# Patient Record
Sex: FEMALE | Race: BLACK OR AFRICAN AMERICAN | Employment: UNEMPLOYED | URBAN - METROPOLITAN AREA
[De-identification: names, ages, dates, MRNs, and addresses within clinical notes are randomized per-mention and may not be internally consistent; named-entity substitution may affect disease eponyms.]

---

## 2018-05-10 ENCOUNTER — HOSPITAL ENCOUNTER (OUTPATIENT)
Dept: MAMMOGRAPHY | Age: 53
Discharge: HOME | End: 2018-05-12
Payer: COMMERCIAL

## 2018-05-10 DIAGNOSIS — Z12.31 VISIT FOR SCREENING MAMMOGRAM: ICD-10-CM

## 2018-05-10 PROCEDURE — 77063 BREAST TOMOSYNTHESIS BI: CPT

## 2020-11-03 ENCOUNTER — HOSPITAL ENCOUNTER (OUTPATIENT)
Dept: GENERAL RADIOLOGY | Age: 55
Discharge: HOME | End: 2020-11-05

## 2020-11-03 ENCOUNTER — HOSPITAL ENCOUNTER (OUTPATIENT)
Age: 55
Discharge: HOME | End: 2020-11-05

## 2020-11-03 PROCEDURE — 71046 X-RAY EXAM CHEST 2 VIEWS: CPT

## 2022-12-27 ENCOUNTER — HOSPITAL ENCOUNTER (OUTPATIENT)
Dept: NEUROLOGY | Age: 57
Discharge: HOME | End: 2022-12-27
Payer: MEDICAID

## 2022-12-27 VITALS — WEIGHT: 240 LBS | HEIGHT: 64 IN | BODY MASS INDEX: 40.97 KG/M2

## 2022-12-27 PROCEDURE — 95911 NRV CNDJ TEST 9-10 STUDIES: CPT

## 2022-12-27 PROCEDURE — 95886 MUSC TEST DONE W/N TEST COMP: CPT

## 2022-12-27 NOTE — PROCEDURES
[address] [facility]  [address] [facility], [address] [place] [address]  Phone: [phone]  Fax: [phone]      Referring Provider: Dixie Albright [place]*  Primary Care Physician: Sallie Jacobson MD  Patient Name: Kika Davis  Patient YOB: 1965  Gender: female  BMI: Body mass index is 41.2 kg/m². Height 5' 4\" (1.626 m), weight 240 lb (108.9 kg), not currently breastfeeding. 12/27/2022    Reason for referral: EMG    Description of clinical problem:   No chief complaint on file. Pain Yes   ; Numbness/tingling  Yes; Weakness  No       Brief physical exam:   Sensory deficit No; Weakness No; Atrophy  No; Reflex abnormality Yes      Study Limitations:  none    Summary of Findings:   Nerve conduction studies: The following nerve conduction studies were abnormal:   none  All other nerve conduction studies listed in the table above were normal in latency, amplitude and conduction velocity. [facility]   Electrodiagnostic Laboratory  [place]        Full Name: Cristian Tatum Gender: Female  MRN: [id number] YOB: 1965  Location[de-identified] Outpt. Visit Date: 12/27/2022 09:07  Age: 59 Years 1 Months Old  Examining Physician: Dr. Jasmin Copeland  Referring Physician: MANNY Watts NP, S  Technician: Delia Chambers   Height: 5 feet 4 inch  Weight: 240 lbs  Notes: Paresthesia lower ext. R20.2      Motor NCS      Nerve / Sites Lat. Amplitude Distance Lat Diff Velocity Temp. Amp. 1-2    ms mV cm ms m/s °C %   L Peroneal - EDB      Ankle 3.54 4.2 8   31 100      Pop fossa 11.35 3.0 39 7.81 50 31 71.9   R Peroneal - EDB      Ankle 3.33 10.4 8   31.2 100      Pop fossa 11.51 8.0 39 8.18 48 31.3 77.5   R Tibial - AH      Ankle 3.96 7.1 8   31 100      Pop fossa 13.33 4.3 41 9.38 44 31 60.3   L Tibial - AH      Ankle 4.27 3.5 8   31.6 100      Pop fossa 12.86 3.0 41 8.59 48 31.6 84       Sensory NCS      Nerve / Sites Onset Lat Peak Lat PP Amp Distance Velocity Temp.    ms ms µV cm m/s °C   L Superficial peroneal - Ankle      Lat leg 2.40 2.76 7.9 10 42 31   R Superficial peroneal - Ankle      Lat leg 2.19 2.76 6.9 10 46 31.5   R Sural - Ankle (Calf)      Calf 2.55 3.44 6.6 14 55 31.2   L Sural - Ankle (Calf)      Calf 3.02 3.91 8.6 14 46 31.5       F  Wave      Nerve F Lat M Lat F-M Lat    ms ms ms   L Peroneal - EDB 48.7 4.1 44.6   R Peroneal - EDB 47.3 3.8 43.5   R Tibial - AH 49.0 4.3 44.6   L Tibial - AH 50.9 5.7 45.2       H Reflex      Nerve Lat Hmax    ms   R Tibial - Soleus 29.4   L Tibial - Soleus 29.3       EMG         EMG Summary Table     Spontaneous MUAP Recruitment   Muscle IA Fib PSW Fasc H.F. Amp Dur. PPP Pattern   L. Extensor digitorum brevis N None None None None N N N N   L. Gastrocnemius (Lateral head) N None None None None N N N N   L. Gastrocnemius (Medial head) N None None None None N N N N   L. Tibialis anterior N None None None None N N N N   L. Vastus lateralis N None None None None N N N N                  Needle EMG:   Needle EMG was performed using a monopolar needle. The following abnormalities were seen on needle EMG: none  All other muscles tested, as listed in the table above demonstrated normal amplitude, duration, phases and recruitment and no active denervation signs were seen. Diagnostic Interpretation:   Normal EMG of lower extremities. There is no evidence of peripheral neuropathy. Small fiber sensory neuropathy cannot be adequately studied by standard electrodiagnostic means. Clinical correlation advised. Technologist: Berenice Mtz MD    Nerve conduction studies and electromyography were performed according to our laboratory policies and procedures which can be provided upon request. All abnormal values are identified in the table.  Laboratory normal values can also be provided upon request.       Cc: Tressa Shearer, APR*  Jones Barnes MD

## 2023-01-10 ENCOUNTER — HOSPITAL ENCOUNTER (OUTPATIENT)
Dept: GENERAL RADIOLOGY | Age: 58
Discharge: HOME | End: 2023-01-12
Payer: MEDICAID

## 2023-01-10 VITALS — HEIGHT: 64 IN | WEIGHT: 243 LBS | BODY MASS INDEX: 41.48 KG/M2

## 2023-01-10 DIAGNOSIS — N64.4 MASTODYNIA: ICD-10-CM

## 2023-01-10 PROCEDURE — 77066 DX MAMMO INCL CAD BI: CPT

## 2023-06-19 NOTE — PROGRESS NOTES
Patient scheduled 9/26/23 11:30 am ordered by Sawyer Jerez NP. Patient given instructions and verbalized understanding.

## 2023-09-25 ENCOUNTER — TELEPHONE (OUTPATIENT)
Dept: CARDIAC CATH/INVASIVE PROCEDURES | Age: 58
End: 2023-09-25

## 2023-09-26 ENCOUNTER — HOSPITAL ENCOUNTER (OUTPATIENT)
Dept: CARDIAC CATH/INVASIVE PROCEDURES | Age: 58
Discharge: HOME | End: 2023-09-26
Payer: MEDICAID

## 2023-09-26 VITALS
TEMPERATURE: 97 F | OXYGEN SATURATION: 93 % | DIASTOLIC BLOOD PRESSURE: 53 MMHG | BODY MASS INDEX: 37.56 KG/M2 | RESPIRATION RATE: 18 BRPM | HEART RATE: 54 BPM | HEIGHT: 64 IN | WEIGHT: 220 LBS | SYSTOLIC BLOOD PRESSURE: 168 MMHG

## 2023-09-26 PROBLEM — R55 SYNCOPE: Status: ACTIVE | Noted: 2023-09-26

## 2023-09-26 PROCEDURE — 93660 TILT TABLE EVALUATION: CPT | Performed by: INTERNAL MEDICINE

## 2023-09-26 PROCEDURE — 93660 TILT TABLE EVALUATION: CPT

## 2023-09-26 RX ORDER — PRAVASTATIN SODIUM 40 MG
40 TABLET ORAL DAILY
COMMUNITY

## 2023-09-26 NOTE — PROCEDURES
[address] [facility] Electrophysiology  Procedure Report  PATIENT: Pedro Moreno  MEDICAL RECORD NUMBER: [id number]  DATE OF PROCEDURE:  9/26/2023  ATTENDING ELECTROPHYSIOLOGIST:  Angel Drake MD  REFERRING PHYSICIAN: KATHY Palomo    PROCEDURE:    1. Tilt Table Testing    INDICATION: Fall versus syncope    PROCEDURE PERFORMED BY: Angel Drake MD    PROCEDURE TIME: Thirty minutes    COMPLICATIONS: None immediately apparent    DESCRIPTION OF PROCEDURE: The risks, benefits, and alternatives to the procedure were discussed with the patient, and informed consent was obtained. The patient was brought to the Tilt table lab. Baseline supine blood pressures and heart rates were obtained. The baseline blood pressure was 149/58 mmHg and baseline heart rate is 55 bpm.  After 5 minutes in the supine position, the patient was placed in the 70 degree head-upright position. After approximately 20 minutes no symptoms had occurred and thus, a single 0.4mg sublingual nitroglycerine tablet was given and the 70 degree head-upright position was continued. The justin BP recorded was 104/68 mmHg with a heart rate of 94 bpm.  The patient had no symptoms. The patient was placed back down in the supine position. At the conclusion of tilt table testing, the patient's blood pressure and heart rate were back at baseline. SUMMARY:  1. Negative tilt table test for Neurocardiogenic syncope. RECOMMENDATIONS:  1. Maintain adequate hydration. Drink approximately 2-2.5 L of non-caffeinated beverage/ per day. 2. Limit caffeine and alcohol use. 3. Liberalize salt intake. 4. If prescribed, please wear the support stockings or compression socks as instructed. 5. If medication is prescribed, please take it as instructed. 6. Other life style modifications as per discharge instruction.     Angel Drake MD  Cardiac Electrophysiology  [facility])

## 2023-09-27 NOTE — H&P
Media Information      Document Information    Consult Note:  Consultation   9/25/23 Office note Dr. Debbie Alfonso   09/26/2023 11:01   Attached To:   Abstract on 9/26/23 with Elba Delacruz MA [facility]       Addendum:    Office note reviewed. No change has been made. The patient seen and examined. Risks, benefits and alternatives of TTT were discussed. The patient verbalizes understanding and agrees to proceed with the procedure.        Emelia Elias MD  Cardiac Electrophysiology  [address] [facility]  [facility] [place] [facility] Electrophysiology  1:21 PM  9/27/2023

## 2023-11-17 ENCOUNTER — HOSPITAL ENCOUNTER (OUTPATIENT)
Dept: PHYSICAL THERAPY | Age: 58
Setting detail: THERAPIES SERIES
Discharge: HOME | End: 2023-11-17
Payer: MEDICAID

## 2023-11-17 PROCEDURE — 97161 PT EVAL LOW COMPLEX 20 MIN: CPT

## 2024-01-16 ENCOUNTER — HOSPITAL ENCOUNTER (OUTPATIENT)
Dept: PHYSICAL THERAPY | Age: 59
Setting detail: THERAPIES SERIES
Discharge: HOME | End: 2024-01-16

## 2024-01-18 ENCOUNTER — HOSPITAL ENCOUNTER (OUTPATIENT)
Dept: PHYSICAL THERAPY | Age: 59
Setting detail: THERAPIES SERIES
Discharge: HOME | End: 2024-01-18

## 2024-01-23 ENCOUNTER — HOSPITAL ENCOUNTER (OUTPATIENT)
Dept: PHYSICAL THERAPY | Age: 59
Setting detail: THERAPIES SERIES
Discharge: HOME | End: 2024-01-23
Payer: MEDICAID

## 2024-01-23 PROCEDURE — 97113 AQUATIC THERAPY/EXERCISES: CPT

## 2024-01-25 ENCOUNTER — HOSPITAL ENCOUNTER (OUTPATIENT)
Dept: NEUROLOGY | Age: 59
Discharge: HOME | End: 2024-01-25
Payer: MEDICAID

## 2024-01-25 ENCOUNTER — HOSPITAL ENCOUNTER (OUTPATIENT)
Dept: PHYSICAL THERAPY | Age: 59
Setting detail: THERAPIES SERIES
Discharge: HOME | End: 2024-01-25
Payer: MEDICAID

## 2024-01-25 PROBLEM — M54.12 CERVICAL RADICULOPATHY: Status: ACTIVE | Noted: 2024-01-25

## 2024-01-25 PROBLEM — G56.03 BILATERAL CARPAL TUNNEL SYNDROME: Status: ACTIVE | Noted: 2024-01-25

## 2024-01-25 PROCEDURE — 95910 NRV CNDJ TEST 7-8 STUDIES: CPT | Performed by: PSYCHIATRY & NEUROLOGY

## 2024-01-25 PROCEDURE — 95886 MUSC TEST DONE W/N TEST COMP: CPT

## 2024-01-25 PROCEDURE — 95910 NRV CNDJ TEST 7-8 STUDIES: CPT

## 2024-01-25 PROCEDURE — 95886 MUSC TEST DONE W/N TEST COMP: CPT | Performed by: PSYCHIATRY & NEUROLOGY

## 2024-01-25 NOTE — PROCEDURES
profundus (Ulnar) N None None None None N N N N   R. Flexor pollicis longus N None None None None N N N N   R. First dorsal interosseous N None None None None N N N N   R. Abductor pollicis brevis N None None None None N N N N         Nerve conduction studies in both arms found minimal delays in the distal motor latencies of both median nerves.  Both median distal sensory latencies were prolonged, with decreased antidromic conduction velocities.  Both median F-wave latencies were also delayed.    These findings were compared to the referential evaluation this laboratory, available upon request.    Electrodiagnostic examination of the right arm was unremarkable.  However, acute and chronic denervation changes were seen in the paraspinals.    Electrodiagnostic examination of both arms disclosed evidence diagnostic of the following---    1.  Bilateral median neuropathies at/or distal to the wrists, of minimal severity.  These findings were consistent with carpal tunnel syndrome.    2.  Right cervical motor radiculopathy or intracanalicular lesions, as noted by the denervation changes in the paraspinals.  However, these radiculopathies were not further defined within normal needle testing of the arm, forearm and hand musculature.    There were no other peripheral neuropathies.  There are no other motor radiculopathies or intracanalicular lesions.  Sensory radiculopathies cannot be evaluated by electrodiagnostic means.    Clinically, the patient noted paresthesias in both hands, which awakened her at night.  Flicking her wrists proved helpful.  She denied any pains or other discomforts.  The recent MRI of her cervical spine revealed only minimal degenerative changes.      On brief neurological examination, I found no Tinel's signs at the wrists, or motor or sensory compromise.  Her difficulties are likely related to the carpal tunnel syndrome; however, the cervical radicular disease may be contributing.    Clinical

## 2024-01-27 NOTE — PROGRESS NOTES
[facility]  Phone: [phone] Fax: [phone]        Physical Therapy Aquatic Flow Sheet       Date 2024      Date:  2024    Patient Name:  Shania Hooker    :  1965  Restrictions/Precautions:    Diagnosis:   Pain in unspecified joint [M25.50] Multi joint chronic pain   Treatment Diagnosis:    Insurance/Certification information:  [facility] / Plan: [facility] [place] / Product Type: *No Product type* /   Insurance ID: [id number] - (Medicaid Managed)  Referring Physician:  Destinee Downey APRN - NP     PCP: Destinee Downey APRN - NP  Plan of care signed (Y/N):    Visit# / total visits:    Pain level: 8/10     Electronically signed by:  Rabia Sorto PTA  Time In:0900  Time Out:1000    Subjective:Patient presents to PT to assess and treat complaints of persistent lower back pain. Patient reports a signifcant series of physical events in the past 1+ years including falls and pain manifesting in multiple areas. Patient reportts she has undergone \"every test ther is\" with no significant musculo-skeletal , neurological or cardiac events Patient does report a recent dx of an umbillical hernia with possibility of needing surgery in the future       Key  B= Belt DB= Dumbells T= Theratube   H= Hydrotone N= Noodles W= Weights   P= Paddles S= Speedo equipment K= Kickboard     Exercises/Activities   Warm-up/Amb  MInutes  Exercises  Minutes    Slow forward  5  HR/TR  5    Slow sideways  5  Marches  5    Slow backwards  5  Mini-squats  5    Medium forward    4-way SLR  5    Medium sideways    Hip circles/fig 8  5    Small shuffle    Hamstring curls  5    Jog    Knee extension  5    Braiding    Pelvic tilts  5    Bicycling  5  Scap squeezes          Shoulder flex/ext      Functional    Shoulder abd/add      Step    Shoulder H. abd/add      Lifting    Shoulder IR/ER      Hand to opp knee    Rowing      Push down squat

## 2024-01-30 ENCOUNTER — HOSPITAL ENCOUNTER (OUTPATIENT)
Dept: PHYSICAL THERAPY | Age: 59
Setting detail: THERAPIES SERIES
Discharge: HOME | End: 2024-01-30
Payer: MEDICAID

## 2024-02-01 ENCOUNTER — HOSPITAL ENCOUNTER (OUTPATIENT)
Dept: PHYSICAL THERAPY | Age: 59
Setting detail: THERAPIES SERIES
Discharge: HOME | End: 2024-02-01

## 2024-02-08 ENCOUNTER — HOSPITAL ENCOUNTER (OUTPATIENT)
Dept: PHYSICAL THERAPY | Age: 59
Setting detail: THERAPIES SERIES
Discharge: HOME | End: 2024-02-08
Payer: MEDICAID

## 2024-02-13 ENCOUNTER — HOSPITAL ENCOUNTER (OUTPATIENT)
Dept: PHYSICAL THERAPY | Age: 59
Setting detail: THERAPIES SERIES
Discharge: HOME | End: 2024-02-13
Payer: MEDICAID

## 2024-02-15 ENCOUNTER — HOSPITAL ENCOUNTER (OUTPATIENT)
Dept: PHYSICAL THERAPY | Age: 59
Setting detail: THERAPIES SERIES
End: 2024-02-15
Payer: MEDICAID

## 2024-04-09 ENCOUNTER — TELEPHONE (OUTPATIENT)
Dept: ORTHOPEDIC SURGERY | Age: 59
End: 2024-04-09

## 2025-04-23 ENCOUNTER — HOSPITAL ENCOUNTER (OUTPATIENT)
Dept: PHYSICAL THERAPY | Age: 60
Setting detail: THERAPIES SERIES
Discharge: HOME | End: 2025-04-23
Payer: MEDICARE

## 2025-04-23 DIAGNOSIS — M25.561 RIGHT KNEE PAIN, UNSPECIFIED CHRONICITY: Primary | ICD-10-CM

## 2025-04-23 PROCEDURE — 97161 PT EVAL LOW COMPLEX 20 MIN: CPT | Performed by: PHYSICAL THERAPIST

## 2025-04-23 NOTE — THERAPY EVALUATION
[facility]   Phone: [phone]   Fax: [phone]         Date:  2025   Patient: Shania Hooker  : 1965  MRN: [id number]  Referring Provider: Destinee Downey APRN - NP  [address]  [place] [address]     Medical Diagnosis:     M25.561 (ICD-10-CM) - Pain in right knee      SUBJECTIVE:     Onset date: 2 years    Onset: Sudden onset    Mechanism of Injury: Pt reports that she had multiple falls over the last 2 years of unknown onset. She reports that she has had therapy multiple times and had a  following this. She reports continued pain in B knees and reports conflicting information on OA in knees. She is unsure why she is falling. She would like to attend aquatic PT to help reduce strain in the knees.     Previous PT: yes - helped     Medical Management for Current Problem:  none    Chief complaint: pain, morning pain / stiffness, pain that worsens as day wears on , edema, decreased motion, decreased mobility, weakness, inability / limited ability to use leg, difficulty walking, difficulty with stairs, limited ability to lift/carry/handle material, limited ability to complete home/outdoor chores/tasks, inability to participate in exercise regimen / fitness program, decreased endurance, decreased balance, falls    Behavior: condition is waxing / waning    Pain: waxing and waning  Current: 0/10     Best: 0/10     Worst:8/10    Symptom Type/Quality: dull, aching, burning  Location:: Knee: medial B    Aggravated by: weightbearing, walking, standing    Relieved by: rest    Imaging results: No results found.    Past Medical History:  Past Medical History:   Diagnosis Date    Anemia     Diabetes (HCC)     GERD (gastroesophageal reflux disease)     Hyperlipidemia     Tilt table evaluation 2023    Dr. Campos     Past Surgical History:   Procedure Laterality Date    COLONOSCOPY      UPPER GASTROINTESTINAL ENDOSCOPY

## 2025-05-01 ENCOUNTER — HOSPITAL ENCOUNTER (OUTPATIENT)
Dept: PHYSICAL THERAPY | Age: 60
Setting detail: THERAPIES SERIES
Discharge: HOME | End: 2025-05-01
Payer: MEDICARE

## 2025-05-01 PROCEDURE — 97113 AQUATIC THERAPY/EXERCISES: CPT

## 2025-05-06 ENCOUNTER — HOSPITAL ENCOUNTER (OUTPATIENT)
Dept: PHYSICAL THERAPY | Age: 60
Setting detail: THERAPIES SERIES
Discharge: HOME | End: 2025-05-06
Payer: MEDICARE

## 2025-05-06 PROCEDURE — 97113 AQUATIC THERAPY/EXERCISES: CPT

## 2025-05-06 NOTE — PROGRESS NOTES
60    Total Treatment Minutes:  60    Treatment/Activity Tolerance:  [] Patient tolerated treatment well [x] Patient limited by fatique  [x] Patient limited by pain [] Patient limited by other medical complications  [] Other:     Prognosis: [] Good [] Fair  [] Poor    Patient Requires Follow-up: [x] Yes  [] No    Plan:   [x] Continue per plan of care [] Alter current plan (see comments)  [] Plan of care initiated [] Hold pending MD visit [] Discharge    Treatment Charges: Mins Units   Initial Evaluation     Re-Evaluation     Ther Exercise         TE     Aquatic Treatment 60 4   Ther Activities        TA     Gait Training          GT     Neuro Re-education NR     Modalities     Non-Billable Service Time     Other     Total Time/Units 60 4        Electronically signed by:  Rabia Sorto PTA [id number]

## 2025-05-29 ENCOUNTER — HOSPITAL ENCOUNTER (OUTPATIENT)
Dept: PHYSICAL THERAPY | Age: 60
Setting detail: THERAPIES SERIES
Discharge: HOME | End: 2025-05-29

## 2025-05-29 PROCEDURE — 97113 AQUATIC THERAPY/EXERCISES: CPT

## 2025-06-03 ENCOUNTER — HOSPITAL ENCOUNTER (OUTPATIENT)
Dept: PHYSICAL THERAPY | Age: 60
Setting detail: THERAPIES SERIES
Discharge: HOME | End: 2025-06-03

## 2025-06-05 ENCOUNTER — HOSPITAL ENCOUNTER (OUTPATIENT)
Dept: PHYSICAL THERAPY | Age: 60
Setting detail: THERAPIES SERIES
Discharge: HOME | End: 2025-06-05

## 2025-06-07 NOTE — PROGRESS NOTES
[facility]  Phone: [phone] Fax: [phone]        Physical Therapy Aquatic Flow Sheet   Date:  2025    Patient Name:  Shania Hooker    :  1965  Restrictions/Precautions:    Diagnosis:    Treatment Diagnosis:    Insurance/Certification information:    Referring Physician:    Plan of care signed (Y/N):    Visit# / total visits:  6  Pain level: 7/10   Electronically signed by:  Rabia Sorto PTA  Time In:0900  Time Out:1000    Subjective: Pt reports that she had multiple falls over the last 2 years of unknown onset. She reports that she has had therapy multiple times and had a  following this. She reports continued pain in B knees and reports conflicting information on OA in knees. She is unsure why she is falling. She would like to attend aquatic PT to help reduce strain in the knees.       Key  B= Belt DB= Dumbells T= Theratube   H= Hydrotone N= Noodles W= Weights   P= Paddles S= Speedo equipment K= Kickboard     Exercises/Activities   Warm-up/Amb  Minutes  Exercises  Minutes    Slow forward   5  HR/TR  5    Slow sideways  5  Marches  5    Slow backwards  5  Mini-squats  5    Medium forward   Forward backward kick  5    Medium sideways    Hip abduction  5    Small shuffle    Hamstring curls      Jog    Knee extension      Braiding    Pelvic tilts      Bicycling  5  Scap squeezes          Shoulder flex/ext      Functional    Shoulder abd/add      Step    Shoulder H. abd/add      Lifting    Shoulder IR/ER      Hand to opp knee    Rowing      Push down squat    Bilateral pull down      UE PNF    Push/pull      LE PNF    Push downs      Wall push ups    Arm circles      SLS    Elbow flex/ext          Chin tuck      Stretching    UT shrugs/rolls      Gastroc/Soleus  5  Rocking horse      Hamstring  5        SKTC  5  Other      Piriformis          Hip flexor          Ladder pull          Pec stretch          Post deltoid           Timed Code Treatment

## 2025-06-10 ENCOUNTER — HOSPITAL ENCOUNTER (OUTPATIENT)
Dept: PHYSICAL THERAPY | Age: 60
Setting detail: THERAPIES SERIES
Discharge: HOME | End: 2025-06-10
Payer: MEDICARE

## 2025-06-24 ENCOUNTER — APPOINTMENT (OUTPATIENT)
Dept: PHYSICAL THERAPY | Age: 60
End: 2025-06-24
Payer: MEDICARE

## 2025-06-26 ENCOUNTER — APPOINTMENT (OUTPATIENT)
Dept: PHYSICAL THERAPY | Age: 60
End: 2025-06-26
Payer: MEDICARE